# Patient Record
Sex: FEMALE | Race: WHITE | Employment: OTHER | URBAN - METROPOLITAN AREA
[De-identification: names, ages, dates, MRNs, and addresses within clinical notes are randomized per-mention and may not be internally consistent; named-entity substitution may affect disease eponyms.]

---

## 2018-10-26 ENCOUNTER — HOSPITAL ENCOUNTER (OUTPATIENT)
Dept: MAMMOGRAPHY | Age: 78
Discharge: HOME | End: 2018-10-28
Payer: MEDICARE

## 2018-10-26 DIAGNOSIS — Z12.31 VISIT FOR SCREENING MAMMOGRAM: ICD-10-CM

## 2018-10-26 PROCEDURE — 77063 BREAST TOMOSYNTHESIS BI: CPT

## 2021-08-14 ENCOUNTER — HOSPITAL ENCOUNTER (EMERGENCY)
Age: 81
Discharge: HOME | End: 2021-08-14
Attending: EMERGENCY MEDICINE
Payer: MEDICARE

## 2021-08-14 ENCOUNTER — APPOINTMENT (OUTPATIENT)
Dept: CT IMAGING | Age: 81
End: 2021-08-14
Payer: MEDICARE

## 2021-08-14 VITALS
RESPIRATION RATE: 16 BRPM | HEART RATE: 92 BPM | DIASTOLIC BLOOD PRESSURE: 74 MMHG | TEMPERATURE: 98.1 F | SYSTOLIC BLOOD PRESSURE: 132 MMHG | WEIGHT: 115 LBS | BODY MASS INDEX: 19.63 KG/M2 | OXYGEN SATURATION: 98 % | HEIGHT: 64 IN

## 2021-08-14 DIAGNOSIS — S22.080A COMPRESSION FRACTURE OF T12 VERTEBRA, INITIAL ENCOUNTER (HCC): ICD-10-CM
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
## 2021-08-14 PROCEDURE — 72131 CT LUMBAR SPINE W/O DYE: CPT

## 2021-08-14 PROCEDURE — 99282 EMERGENCY DEPT VISIT SF MDM: CPT

## 2021-08-14 PROCEDURE — 70450 CT HEAD/BRAIN W/O DYE: CPT

## 2021-08-14 RX ORDER — IBUPROFEN 800 MG/1
800 TABLET ORAL EVERY 8 HOURS PRN
Qty: 21 TABLET | Refills: 0 | Status: SHIPPED | OUTPATIENT
Start: 2021-08-14 | End: 2022-07-24

## 2021-08-14 RX ORDER — ACETAMINOPHEN 500 MG
1000 TABLET ORAL EVERY 8 HOURS PRN
Qty: 21 TABLET | Refills: 0 | Status: SHIPPED | OUTPATIENT
Start: 2021-08-14

## 2021-08-14 ASSESSMENT — PAIN DESCRIPTION - ORIENTATION: ORIENTATION: LOWER

## 2021-08-14 ASSESSMENT — PAIN DESCRIPTION - PAIN TYPE: TYPE: ACUTE PAIN

## 2021-08-14 ASSESSMENT — PAIN DESCRIPTION - LOCATION: LOCATION: BACK

## 2021-08-14 ASSESSMENT — ENCOUNTER SYMPTOMS
BACK PAIN: 1
VOMITING: 0
NAUSEA: 0

## 2021-08-14 ASSESSMENT — PAIN SCALES - GENERAL: PAINLEVEL_OUTOF10: 3

## 2021-08-14 NOTE — ED PROVIDER NOTES
[facility]  Emergency Department Encounter     Pt Name: Sivan Mcgowan  MRN: [id number]  [place] 1940  Date of evaluation: 8/14/21  PCP:  No primary care provider on file. CHIEF COMPLAINT       Chief Complaint   Patient presents with    Fall     on sunday     Back Pain       HISTORY OF PRESENT ILLNESS  (Location/Symptom, Timing/Onset, Context/Setting, Quality, Duration, Modifying Factors, Severity.)    Sivan Mcgowan is a [de-identified] y.o. female who presents with mechanical fall on Sunday. Patient tripped and fell backwards and hit her head on some woodwork. Did not have any loss of consciousness. Since then family feels like she is a little bit slower than normal.  Patient states that her vision things are not as crisp as they normally are. She is also having some midline back pain. Has been taking Motrin for this at home and does temporarily help relieve the pain. No neck pain. No bowel or bladder incontinence, no urinary retention, no saddle anesthesias. No ringing in her ears, no bloody nose, no nausea or vomiting. No numbness tingling or weakness. PAST MEDICAL / SURGICAL / SOCIAL / FAMILY HISTORY    has no past medical history on file. has no past surgical history on file.     Social History     Socioeconomic History    Marital status:      Spouse name: Not on file    Number of children: Not on file    Years of education: Not on file    Highest education level: Not on file   Occupational History    Not on file   Tobacco Use    Smoking status: Never Smoker   Substance and Sexual Activity    Alcohol use: No    Drug use: Not on file    Sexual activity: Not on file   Other Topics Concern    Not on file   Social History Narrative    Not on file     Social Determinants of Health     Financial Resource Strain:     Difficulty of Paying Living Expenses:    Food Insecurity:     Worried About Running Out of Food in the Last Year:     [address] in the Last Year:    Transportation Needs:     Lack of Transportation (Medical):  Lack of Transportation (Non-Medical):    Physical Activity:     Days of Exercise per Week:     Minutes of Exercise per Session:    Stress:     Feeling of Stress :    Social Connections:     Frequency of Communication with Friends and Family:     Frequency of Social Gatherings with Friends and Family:     Attends Voodoo Services:     Active Member of Clubs or Organizations:     Attends Club or Organization Meetings:     Marital Status:    Intimate Partner Violence:     Fear of Current or Ex-Partner:     Emotionally Abused:     Physically Abused:     Sexually Abused:        Family History   Problem Relation Age of Onset    Diabetes Sister        Allergies:    Patient has no known allergies. Home Medications:  Prior to Admission medications    Medication Sig Start Date End Date Taking? Authorizing Provider   acetaminophen (TYLENOL) 500 MG tablet Take 2 tablets by mouth every 8 hours as needed for Pain 8/14/21  Yes Angeline Janene Eisenmenger, DO   ibuprofen (IBU) 800 MG tablet Take 1 tablet by mouth every 8 hours as needed for Pain 8/14/21  Yes Angeline Janene Eisenmenger, DO       REVIEW OF SYSTEMS    (2-9 systems for level 4, 10 or more for level 5)    Review of Systems   HENT: Negative for nosebleeds and tinnitus. Eyes: Positive for visual disturbance. Gastrointestinal: Negative for nausea and vomiting. Genitourinary: Negative for flank pain. Musculoskeletal: Positive for back pain and myalgias. Skin: Positive for wound. Neurological: Negative for dizziness, syncope, weakness and light-headedness. Hematological: Does not bruise/bleed easily. Psychiatric/Behavioral: Positive for confusion (per family).        PHYSICAL EXAM   (up to 7 for level 4, 8 or more for level 5)    VITALS:   Vitals:    08/14/21 1106 08/14/21 1112   BP: 132/74    Pulse: 92    Resp: 16    Temp:  98.1 °F (36.7 °C)   SpO2: 98%    Weight: 115 lb Sensation is intact. Motor: Motor function is intact. Coordination: Coordination is intact. Gait: Gait is intact. Psychiatric:         Speech: Speech normal.         Behavior: Behavior normal.         DIFFERENTIAL  DIAGNOSIS   PLAN (LABS / IMAGING / EKG):  Orders Placed This Encounter   Procedures    CT HEAD WO CONTRAST    CT LUMBAR SPINE WO CONTRAST    PRASANNA - Veronica Woods MD, Orthopedic Surgery, [place]       MEDICATIONS ORDERED:  Orders Placed This Encounter   Medications    acetaminophen (TYLENOL) 500 MG tablet     Sig: Take 2 tablets by mouth every 8 hours as needed for Pain     Dispense:  21 tablet     Refill:  0    ibuprofen (IBU) 800 MG tablet     Sig: Take 1 tablet by mouth every 8 hours as needed for Pain     Dispense:  21 tablet     Refill:  0     DIAGNOSTIC RESULTS / EMERGENCYDEPARTMENT COURSE / MDM   LABS:  Labs Reviewed - No data to display    RADIOLOGY:  CT HEAD WO CONTRAST    Result Date: 8/14/2021  EXAMINATION: CT OF THE HEAD WITHOUT CONTRAST  8/14/2021 11:17 am TECHNIQUE: CT of the head was performed without the administration of intravenous contrast. Dose modulation, iterative reconstruction, and/or weight based adjustment of the mA/kV was utilized to reduce the radiation dose to as low as reasonably achievable. COMPARISON: None. HISTORY: ORDERING SYSTEM PROVIDED HISTORY: fall, confusion, blurry vision TECHNOLOGIST PROVIDED HISTORY: fall, confusion, blurry vision Decision Support Exception - unselect if not a suspected or confirmed emergency medical condition->Emergency Medical Condition (MA) Reason for Exam: Pt states she fell last Sunday (6 days ago), hit back of head, and small laceration to left parietal area. No LOC Acuity: Acute Type of Exam: Initial FINDINGS: BRAIN/VENTRICLES: There is no acute intracranial hemorrhage, mass effect or midline shift. No abnormal extra-axial fluid collection.   The gray-white differentiation is maintained without evidence of an acute infarct. There is no evidence of hydrocephalus. ORBITS: The visualized portion of the orbits demonstrate no acute abnormality. SINUSES: The visualized paranasal sinuses and mastoid air cells demonstrate no acute abnormality. SOFT TISSUES/SKULL:  No acute abnormality of the visualized skull or soft tissues. No acute intracranial abnormality. CT LUMBAR SPINE WO CONTRAST    Result Date: 8/14/2021  EXAMINATION: CT OF THE LUMBAR SPINE WITHOUT CONTRAST  8/14/2021 TECHNIQUE: CT of the lumbar spine was performed without the administration of intravenous contrast. Multiplanar reformatted images are provided for review. Dose modulation, iterative reconstruction, and/or weight based adjustment of the mA/kV was utilized to reduce the radiation dose to as low as reasonably achievable. COMPARISON: None HISTORY: ORDERING SYSTEM PROVIDED HISTORY: FALLING TECHNOLOGIST PROVIDED HISTORY: fall. midline pain Decision Support Exception - unselect if not a suspected or confirmed emergency medical condition->Emergency Medical Condition (MA) Reason for Exam: Pt fell 6 days ago, complains of pain lumbar/sacral area. No prior surgery Acuity: Acute Type of Exam: Initial FINDINGS: Comminuted fracture involving superior endplate of P34 with depression of approximately 10% and mild retropulsion. No fracture or malalignment of the lumbar spine. Severe disc space narrowing at L1/L2, L2/L3, and L5/S1. Facet arthropathy involving lower lumbar segments of lumbar spine notable at L4/L5 and L5/S1. 1. Acute appearing fracture involving superior endplate of M51 with depression of approximately 10% and mild retropulsion. MRI could be helpful for further evaluation. 2. No fracture or malalignment of the lumbar spine.        EMERGENCY DEPARTMENT COURSE:  ED Course as of Aug 14 1258   Sat Aug 14, 2021   1236 CT HEAD WO CONTRAST [AO]   9394 CT LUMBAR SPINE WO CONTRAST [AO]      ED Course User Index  [AO] Jazzy Bonilla DO       [facility]  Number of Diagnoses or Management Options  Compression fracture of T12 vertebra, initial encounter ([facility] [address]  Fall, initial encounter  Diagnosis management comments: 52-year-old female who is on any anticoagulation medications had a mechanical fall backwards while at put in day a couple days ago. Mildly confused with some visual disturbances. Complaining of midline low back pain. She evaluation no acute distress normal vital signs. Nonfocal neurological exam.  Does have a laceration to her left frontal area and a contusion to her posterior scalp. Tetanus up-to-date within the last 10 years. CT head obtained which was negative. CT lumbar spine showed a T12 compression fracture 10% height loss. She is neurologically intact on exam for me. Pain controlled with oral Tylenol and Motrin. Will discharge with [facility]. Amount and/or Complexity of Data Reviewed  Tests in the radiology section of CPT®: ordered and reviewed  Review and summarize past medical records: yes  Independent visualization of images, tracings, or specimens: yes    Patient Progress  Patient progress: stable      PROCEDURES:  Procedures     CONSULTS:  None    CRITICAL CARE:  NONE    FINAL IMPRESSION     1. Fall, initial encounter    2. Compression fracture of T12 vertebra, initial encounter ([facility] [address]        DISPOSITION / PLAN   DISPOSITION Decision To Discharge 08/14/2021 12:46:55 PM      Evaluation and treatment course in the ED, and plan of care upon discharge was discussed in length with the patient. Patient had no further questions prior to being discharged and was instructed to return to the ED for new or worsening symptoms. Any changes to existing medications or new prescriptions were reviewed with patient and they expressed understanding of how to correctly take their medications and the possible side effects.     PATIENT REFERRED TO:  [facility] ED  [address] [facility] [address]  [phone]    As needed, If symptoms worsen    Prisca Galdamez MD  [phone] [facility]    Schedule an appointment as soon as possible for a visit       Prisca Galdamez MD  [address]            DISCHARGE MEDICATIONS:  New Prescriptions    ACETAMINOPHEN (TYLENOL) 500 MG TABLET    Take 2 tablets by mouth every 8 hours as needed for Pain    IBUPROFEN (IBU) 800 MG TABLET    Take 1 tablet by mouth every 8 hours as needed for Pain       Sujata Gudino DO  Emergency Medicine Physician    (Please note that portions of this note were completed with a voice recognition program.  Efforts were made to edit the dictations but occasionally words are mis-transcribed.)        Missael Wade 1721,   08/14/21 3951

## 2022-07-24 ENCOUNTER — APPOINTMENT (OUTPATIENT)
Dept: CT IMAGING | Age: 82
End: 2022-07-24
Payer: MEDICARE

## 2022-07-24 ENCOUNTER — HOSPITAL ENCOUNTER (EMERGENCY)
Age: 82
Discharge: HOME | End: 2022-07-24
Attending: EMERGENCY MEDICINE
Payer: MEDICARE

## 2022-07-24 VITALS
SYSTOLIC BLOOD PRESSURE: 118 MMHG | BODY MASS INDEX: 19.63 KG/M2 | HEART RATE: 93 BPM | DIASTOLIC BLOOD PRESSURE: 67 MMHG | OXYGEN SATURATION: 100 % | WEIGHT: 115 LBS | HEIGHT: 64 IN | RESPIRATION RATE: 14 BRPM | TEMPERATURE: 98.7 F

## 2022-07-24 DIAGNOSIS — N30.00 ACUTE CYSTITIS WITHOUT HEMATURIA: Primary | ICD-10-CM

## 2022-07-24 DIAGNOSIS — S39.012A STRAIN OF LUMBAR REGION, INITIAL ENCOUNTER: ICD-10-CM

## 2022-07-24 LAB
-: ABNORMAL
BACTERIA: ABNORMAL
BILIRUBIN URINE: ABNORMAL
CASTS UA: ABNORMAL /LPF
CASTS UA: ABNORMAL /LPF
COLOR: YELLOW
EPITHELIAL CELLS UA: ABNORMAL /HPF (ref 0–5)
GLUCOSE URINE: NEGATIVE
KETONES, URINE: ABNORMAL
LEUKOCYTE ESTERASE, URINE: ABNORMAL
MUCUS: ABNORMAL
NITRITE, URINE: NEGATIVE
PH UA: 5.5 (ref 5–8)
PROTEIN UA: ABNORMAL
RBC UA: ABNORMAL /HPF (ref 0–2)
SPECIFIC GRAVITY UA: 1.04 (ref 1–1.03)
TURBIDITY: ABNORMAL
URINE HGB: NEGATIVE
UROBILINOGEN, URINE: NORMAL
WBC UA: ABNORMAL /HPF (ref 0–5)

## 2022-07-24 PROCEDURE — 81001 URINALYSIS AUTO W/SCOPE: CPT

## 2022-07-24 PROCEDURE — 72131 CT LUMBAR SPINE W/O DYE: CPT

## 2022-07-24 PROCEDURE — 99284 EMERGENCY DEPT VISIT MOD MDM: CPT

## 2022-07-24 PROCEDURE — 6370000000 HC RX 637 (ALT 250 FOR IP): Performed by: EMERGENCY MEDICINE

## 2022-07-24 RX ORDER — CEPHALEXIN 500 MG/1
500 CAPSULE ORAL ONCE
Status: COMPLETED | OUTPATIENT
Start: 2022-07-24 | End: 2022-07-24

## 2022-07-24 RX ORDER — CEPHALEXIN 500 MG/1
500 CAPSULE ORAL 4 TIMES DAILY
Qty: 28 CAPSULE | Refills: 0 | Status: SHIPPED | OUTPATIENT
Start: 2022-07-24 | End: 2022-07-31

## 2022-07-24 RX ORDER — ONDANSETRON 4 MG/1
4 TABLET, ORALLY DISINTEGRATING ORAL 3 TIMES DAILY PRN
Qty: 21 TABLET | Refills: 0 | Status: SHIPPED | OUTPATIENT
Start: 2022-07-24

## 2022-07-24 RX ORDER — MORPHINE SULFATE 4 MG/ML
4 INJECTION, SOLUTION INTRAMUSCULAR; INTRAVENOUS ONCE
Status: DISCONTINUED | OUTPATIENT
Start: 2022-07-24 | End: 2022-07-24 | Stop reason: HOSPADM

## 2022-07-24 RX ADMIN — CEPHALEXIN 500 MG: 500 CAPSULE ORAL at 14:13

## 2022-07-24 ASSESSMENT — PAIN - FUNCTIONAL ASSESSMENT: PAIN_FUNCTIONAL_ASSESSMENT: 0-10

## 2022-07-24 ASSESSMENT — PAIN SCALES - GENERAL: PAINLEVEL_OUTOF10: 3

## 2022-07-24 NOTE — ED PROVIDER NOTES
Abdominal:      General: Abdomen is flat. Musculoskeletal:      Cervical back: Tenderness present. Comments: Mild right paraspinal lumbar tenderness. No midline back tenderness. Skin:     General: Skin is dry. Neurological:      Mental Status: She is alert. Mental status is at baseline. Comments: No saddle anesthesia. No weakness in lower extremities. Psychiatric:         Mood and Affect: Mood normal.         Behavior: Behavior normal.       MEDICAL DECISION MAKING:   The patient is hemodynamically stable, afebrile, nontoxic-appearing. Physical exam notable for right paraspinal lumbar tenderness. Based on history and exam likely lumbar strain, low suspicion for acute fracture or dislocation. ED plan for UA, CT lumbar spine, reassess. DIAGNOSTIC RESULTS   EKG:All EKG's are interpreted by the Emergency Department Physician who either signs or Co-signs this chart in the absence of a cardiologist.        RADIOLOGY:All plain film, CT, MRI, and formal ultrasound images (except ED bedside ultrasound) are read by the radiologist, see reports below, unless otherwisenoted in MDM or here. CT LUMBAR SPINE WO CONTRAST   Final Result   Mild progression of the remote T12 compression deformity with stable   retropulsion. Multilevel degenerative change with bilateral foraminal narrowing at L5-S1. RECOMMENDATIONS:   Unavailable           LABS: All lab results were reviewed by myself, and all abnormals are listed below. Labs Reviewed   URINALYSIS - Abnormal; Notable for the following components:       Result Value    Turbidity UA Cloudy (*)     Bilirubin Urine   (*)     Value: Presumptive positive. Unable to confirm due to unavailability of reagent.     Ketones, Urine 1+ (*)     Specific Gravity, UA 1.045 (*)     Protein, UA 1+ (*)     Leukocyte Esterase, Urine TRACE (*)     All other components within normal limits   MICROSCOPIC URINALYSIS - Abnormal; Notable for the following components: Discharge 07/24/2022 02:05:58 PM      PATIENT REFERRED TO:  No follow-up provider specified. DISCHARGE MEDICATIONS:  New Prescriptions    CEPHALEXIN (KEFLEX) 500 MG CAPSULE    Take 1 capsule by mouth in the morning and 1 capsule at noon and 1 capsule in the evening and 1 capsule before bedtime. Do all this for 7 days.     ONDANSETRON (ZOFRAN-ODT) 4 MG DISINTEGRATING TABLET    Take 1 tablet by mouth 3 times daily as needed for Nausea or Vomiting     Aniket Szymanski MD  Attending Emergency Physician                    Natalya Grande MD  07/24/22 9824

## 2022-07-24 NOTE — ED NOTES
Pt to ED from home with c/o lower back pain that radiates to the abdomen- onset a week ago. Pt had constipation for a couple of days but states it is resolving. Vomited once a week ago and once today. No urinary issues. Hx of compression fracture August 14th of last year. Denies recent injury. Taking tylenol for pain.      David Underwood RN  07/24/22 2905

## 2022-07-24 NOTE — DISCHARGE INSTRUCTIONS
Return to this emergency room immediately if your symptoms persist, worsen or if new ones form. Make sure you follow-up with your primary care doctor within the next 1-2 business days.